# Patient Record
Sex: FEMALE | Race: WHITE | NOT HISPANIC OR LATINO | URBAN - METROPOLITAN AREA
[De-identification: names, ages, dates, MRNs, and addresses within clinical notes are randomized per-mention and may not be internally consistent; named-entity substitution may affect disease eponyms.]

---

## 2021-02-16 ENCOUNTER — OFFICE (OUTPATIENT)
Dept: URBAN - METROPOLITAN AREA TELEHEALTH 3 | Facility: TELEHEALTH | Age: 56
End: 2021-02-16

## 2021-02-16 VITALS — WEIGHT: 163 LBS | HEIGHT: 63 IN

## 2021-02-16 DIAGNOSIS — K59.09 OTHER CONSTIPATION: ICD-10-CM

## 2021-02-16 DIAGNOSIS — I67.89 OTHER CEREBROVASCULAR DISEASE: ICD-10-CM

## 2021-02-16 DIAGNOSIS — K57.32 DIVERTICULITIS OF LARGE INTESTINE WITHOUT PERFORATION OR ABS: ICD-10-CM

## 2021-02-16 DIAGNOSIS — Z79.01 LONG TERM (CURRENT) USE OF ANTICOAGULANTS: ICD-10-CM

## 2021-02-16 DIAGNOSIS — K62.5 HEMORRHAGE OF ANUS AND RECTUM: ICD-10-CM

## 2021-02-16 DIAGNOSIS — I48.0 PAROXYSMAL ATRIAL FIBRILLATION: ICD-10-CM

## 2021-02-16 DIAGNOSIS — I25.10 ATHEROSCLEROTIC HEART DISEASE OF NATIVE CORONARY ARTERY WITH: ICD-10-CM

## 2021-02-16 DIAGNOSIS — I10 ESSENTIAL (PRIMARY) HYPERTENSION: ICD-10-CM

## 2021-02-16 DIAGNOSIS — E11.9 TYPE 2 DIABETES MELLITUS WITHOUT COMPLICATIONS: ICD-10-CM

## 2021-02-16 PROCEDURE — 99204 OFFICE O/P NEW MOD 45 MIN: CPT | Performed by: INTERNAL MEDICINE

## 2021-02-16 NOTE — SERVICEHPINOTES
PATIENT VERIFIED BY DATE OF BIRTH AND NAME. Patient has been consented for this telecommunication visit.   PANCHO REILLY   is a   55   year old    female with DM2, HTN, hx of STEMI, Afib, CVA who is being seen in consultation at the request of   MATT BEDOLLA   for constipation, episode of diverticulitis in June of 2020.  She was admitted with near syncope, diverticulitis, also had enteritis on imaging.  Negative stool studies.  Treated with Omnicef and Flagyl.  She then got Covid in December and is now catching up with her doctors appointments.  She has had one Cologuard maybe a year or two ago (she mentions her memory is not good) and was going to have another Cologuard done in December but hasn't done it.  She has never had a colonoscopy.  Has constipation for many years, 2-3 at least but has been on Senna daily for 4 years.  She also takes occasional magnesium and has tried Miralax remotely.  Moves her bowels 2 or 3 times a month, about once a week.  Sees dark blood with hard stools.  Not taking anything more than senna now.  BRShe has history of STEMI April of 2020, s/p 2 stents, has been on clopidogrel since.  She has been on Eliquis for 2 years before that.  Has hx of CVA x 2 with hemiplegia and vision loss.  Her last A1C in October was 10.  Her last PCP notes focus on uncontrolled HTN and meds were adjusted.  She is also to have a f/u with her neurologist.